# Patient Record
Sex: MALE | Race: WHITE | NOT HISPANIC OR LATINO | URBAN - METROPOLITAN AREA
[De-identification: names, ages, dates, MRNs, and addresses within clinical notes are randomized per-mention and may not be internally consistent; named-entity substitution may affect disease eponyms.]

---

## 2017-01-02 ENCOUNTER — EMERGENCY (EMERGENCY)
Facility: HOSPITAL | Age: 22
LOS: 1 days | Discharge: HOME | End: 2017-01-02
Admitting: INTERNAL MEDICINE
Payer: MEDICAID

## 2017-01-02 PROCEDURE — G0463: CPT

## 2017-07-24 ENCOUNTER — EMERGENCY (EMERGENCY)
Facility: HOSPITAL | Age: 22
LOS: 1 days | Discharge: HOME | End: 2017-07-24
Admitting: EMERGENCY MEDICINE
Payer: COMMERCIAL

## 2017-07-24 ENCOUNTER — TRANSCRIPTION ENCOUNTER (OUTPATIENT)
Age: 22
End: 2017-07-24

## 2017-07-24 PROCEDURE — 99284 EMERGENCY DEPT VISIT MOD MDM: CPT

## 2017-07-25 ENCOUNTER — OUTPATIENT (OUTPATIENT)
Dept: OUTPATIENT SERVICES | Facility: HOSPITAL | Age: 22
LOS: 1 days | End: 2017-07-25
Payer: COMMERCIAL

## 2017-07-25 ENCOUNTER — APPOINTMENT (OUTPATIENT)
Dept: RADIOLOGY | Facility: HOSPITAL | Age: 22
End: 2017-07-25
Payer: MEDICAID

## 2017-07-25 PROCEDURE — 99283 EMERGENCY DEPT VISIT LOW MDM: CPT | Mod: 25

## 2017-07-25 PROCEDURE — 96372 THER/PROPH/DIAG INJ SC/IM: CPT

## 2017-07-25 PROCEDURE — 72100 X-RAY EXAM L-S SPINE 2/3 VWS: CPT | Mod: 26

## 2017-07-25 PROCEDURE — 72100 X-RAY EXAM L-S SPINE 2/3 VWS: CPT

## 2019-06-16 ENCOUNTER — EMERGENCY (EMERGENCY)
Facility: HOSPITAL | Age: 24
LOS: 1 days | Discharge: HOME | End: 2019-06-16
Attending: INTERNAL MEDICINE | Admitting: INTERNAL MEDICINE
Payer: MEDICAID

## 2019-06-16 VITALS
RESPIRATION RATE: 17 BRPM | SYSTOLIC BLOOD PRESSURE: 138 MMHG | OXYGEN SATURATION: 100 % | HEART RATE: 91 BPM | DIASTOLIC BLOOD PRESSURE: 75 MMHG

## 2019-06-16 VITALS
TEMPERATURE: 98 F | DIASTOLIC BLOOD PRESSURE: 79 MMHG | WEIGHT: 184.97 LBS | HEIGHT: 69 IN | RESPIRATION RATE: 18 BRPM | SYSTOLIC BLOOD PRESSURE: 146 MMHG | HEART RATE: 98 BPM

## 2019-06-16 PROCEDURE — 96372 THER/PROPH/DIAG INJ SC/IM: CPT

## 2019-06-16 PROCEDURE — 99283 EMERGENCY DEPT VISIT LOW MDM: CPT | Mod: 25

## 2019-06-16 PROCEDURE — 99283 EMERGENCY DEPT VISIT LOW MDM: CPT

## 2019-06-16 RX ORDER — KETOROLAC TROMETHAMINE 30 MG/ML
30 SYRINGE (ML) INJECTION ONCE
Refills: 0 | Status: DISCONTINUED | OUTPATIENT
Start: 2019-06-16 | End: 2019-06-16

## 2019-06-16 RX ORDER — LIDOCAINE 4 G/100G
2 CREAM TOPICAL ONCE
Refills: 0 | Status: COMPLETED | OUTPATIENT
Start: 2019-06-16 | End: 2019-06-16

## 2019-06-16 RX ORDER — IBUPROFEN 200 MG
1 TABLET ORAL
Qty: 20 | Refills: 0
Start: 2019-06-16 | End: 2019-06-20

## 2019-06-16 RX ORDER — DIAZEPAM 5 MG
1 TABLET ORAL
Qty: 9 | Refills: 0
Start: 2019-06-16 | End: 2019-06-18

## 2019-06-16 RX ORDER — LIDOCAINE 4 G/100G
1 CREAM TOPICAL
Qty: 10 | Refills: 0
Start: 2019-06-16 | End: 2019-06-20

## 2019-06-16 RX ADMIN — LIDOCAINE 2 PATCH: 4 CREAM TOPICAL at 15:53

## 2019-06-16 RX ADMIN — Medication 30 MILLIGRAM(S): at 15:53

## 2019-06-16 RX ADMIN — Medication 30 MILLIGRAM(S): at 16:00

## 2019-06-16 NOTE — ED ADULT NURSE NOTE - NSIMPLEMENTINTERV_GEN_ALL_ED
Implemented All Universal Safety Interventions:  [place] to call system. Call bell, personal items and telephone within reach. Instruct patient to call for assistance. Room bathroom lighting operational. Non-slip footwear when patient is off stretcher. Physically safe environment: no spills, clutter or unnecessary equipment. Stretcher in lowest position, wheels locked, appropriate side rails in place.

## 2019-06-16 NOTE — ED ADULT TRIAGE NOTE - CHIEF COMPLAINT QUOTE
Pt reports that he hurt his back on Tuesday lifting heavy object.  Pain has increased since that time, unrelieved by OTC medications.

## 2019-06-16 NOTE — ED ADULT NURSE NOTE - OBJECTIVE STATEMENT
Pt came in complaining of lower back pain he rates 10/10. Pt states that he hurt his back on Tuesday at work lifting heavy object.  pt states pain has increased since tuesday, and is unrelieved by OTC medications. Pt states he has PMH of back pain which he was seeing a chiropractor for about 3 years ago. Pt denies any n/v/d, chest pain, SOB, blurred vision, headache, dizziness, fever, chills or any other complaint at this time.

## 2019-06-16 NOTE — ED PROVIDER NOTE - CARE PROVIDER_API CALL
Mukesh Jang)  Orthopaedic Sports Medicine; Orthopaedic Surgery  [address] [address]  Phone: [phone]  Fax: [phone]  Follow Up Time:

## 2019-06-16 NOTE — ED PROVIDER NOTE - NSFOLLOWUPINSTRUCTIONS_ED_ALL_ED_FT
Follow with your PMD within 48-72 hours.  Ortho referral above if your symptoms do no improve.   Rest, no heavy lifting.    Warm compresses to area.    Light walking. KEEP MOVING!!!  Take Motrin 600 mg every 6-8 hours for pain with food.  Apply 1-2 Lidoderm patches to your back once a day- leave on for only 12 hours and remove for 12 hours.   Take Valium 5mg at bedtime as needed for muscle spasm- caution drowsiness/do not drive nor operate heavy machinery when taking.   If your symptoms continue, worsen or and new concerning symptoms develop, return to the emergency department.

## 2019-06-16 NOTE — ED PROVIDER NOTE - PROGRESS NOTE DETAILS
DAXA Serrano- patient feeling better s/p Toradol and Lidoderm. Driving home. Valium to the pharm with Motrin and Lidoderm patches. Ortho follow up

## 2019-06-16 NOTE — ED PROVIDER NOTE - CLINICAL SUMMARY MEDICAL DECISION MAKING FREE TEXT BOX
23 y/o M with lumbar para-spinal muscle spasming s/p lumbar strain driving home- Toradol IM, Lidoderm patch, Valium for home at night 25 y/o M with lumbar para-spinal muscle spasming s/p lumbar strain, driving home- Toradol IM, Lidoderm patch, Valium for home at night

## 2019-06-16 NOTE — ED PROVIDER NOTE - ATTENDING CONTRIBUTION TO CARE
· HPI Objective Statement: 23 y/o M h/o depression and anxiety pw lower back pain x 5 days. States he was lifting 50 lb boxes at work and felt "soreness" when he went home that night. Over the next few days the back pain progressively worsened. Has taken Advil without relief. States pain with movement and at rest. Denies radiculopathy of pain, weakness, numbness, tingling, bowel/urinary incontinence. Wearing a waist belt for support. No prior back injuries.  Drove here.  pe + paraspinal lumbar spasm , tenderness , neuro vs bilateral LL intact  pt rx with nsaids, ms relaxants   Dr. Napier:  I have reviewed and discussed with the PA/ resident the case specifics, including the history, physical assessment, evaluation, conclusion, laboratory results, and medical plan. I agree with the contents, and conclusions. I have personally examined, and interviewed the patient.

## 2019-06-16 NOTE — ED PROVIDER NOTE - CHPI ED SYMPTOMS NEG
no numbness/no difficulty bearing weight/no motor function loss/no neck tenderness/no tingling/no bowel dysfunction/no fatigue/no bladder dysfunction

## 2019-06-18 ENCOUNTER — APPOINTMENT (OUTPATIENT)
Dept: ORTHOPEDIC SURGERY | Facility: CLINIC | Age: 24
End: 2019-06-18
Payer: OTHER MISCELLANEOUS

## 2019-06-18 VITALS
BODY MASS INDEX: 27.4 KG/M2 | HEART RATE: 90 BPM | WEIGHT: 185 LBS | SYSTOLIC BLOOD PRESSURE: 108 MMHG | DIASTOLIC BLOOD PRESSURE: 71 MMHG | HEIGHT: 69 IN

## 2019-06-18 DIAGNOSIS — Z72.0 TOBACCO USE: ICD-10-CM

## 2019-06-18 DIAGNOSIS — M54.5 LOW BACK PAIN: ICD-10-CM

## 2019-06-18 DIAGNOSIS — F41.1 GENERALIZED ANXIETY DISORDER: ICD-10-CM

## 2019-06-18 DIAGNOSIS — G89.29 LOW BACK PAIN: ICD-10-CM

## 2019-06-18 PROCEDURE — 72100 X-RAY EXAM L-S SPINE 2/3 VWS: CPT

## 2019-06-18 PROCEDURE — 99204 OFFICE O/P NEW MOD 45 MIN: CPT

## 2019-06-18 RX ORDER — CYCLOBENZAPRINE HYDROCHLORIDE 10 MG/1
10 TABLET, FILM COATED ORAL 3 TIMES DAILY
Qty: 30 | Refills: 3 | Status: ACTIVE | COMMUNITY
Start: 2019-06-18 | End: 1900-01-01

## 2019-06-18 RX ORDER — DICLOFENAC SODIUM 75 MG/1
75 TABLET, DELAYED RELEASE ORAL
Qty: 60 | Refills: 0 | Status: ACTIVE | COMMUNITY
Start: 2019-06-18 | End: 1900-01-01

## 2019-06-18 NOTE — PHYSICAL EXAM
[de-identified] : He is fully alert and oriented with a normal mood and affect. He is in obvious distress attempting to transfer off the examining table. He can tiptoe and heel walk. On stance evaluation there is a list to the left that increases with forward flexion. There is no cutaneous abnormalities no palpable bony defects of the spine. There is no evidence of shortness of breath or respiratory distress. There is some right sided paravertebral muscle spasm but no normal. There is a trace of the sciatic notch sensitivity bilaterally. There is no trochanteric tenderness. Forward flexion of the spine is markedly limited to only 20° bilateral lower back pain. His lower extremity neurological examination revealed 1-2+ symmetrical reflexes with normal motor power and sensation. Straight leg raising is limited to 70 or 80° by lower back pain. His knees have a full range of motion with normal stability. Vascular exam shows no evidence of varicosities there is no lymphedema. There are no cutaneous abnormalities of the upper lower extremities. His upper extremities are normal to inspection and his elbows have a full range of motion with normal motor power and stability. [de-identified] : AP and lateral x-rays of the lumbar spine revealed the list which is evident on exam. Sagittal alignment is normal. There is a suggestion of some mild disc space narrowing at the L4-5 level. There are no destructive changes

## 2019-06-18 NOTE — DISCUSSION/SUMMARY
[Medication Risks Reviewed] : Medication risks reviewed [de-identified] : I recommended rest and moist heat. He has been started on diclofenac 75 mg twice a day as a nonsteroidal anti-inflammatory. I will also prescribe Flexeril 10 mg 3 times a day as tolerated. I will see him for followup in 3 weeks. When his symptoms have resolved he will be sent to physical therapy for instruction in lower back exercises.

## 2019-06-18 NOTE — HISTORY OF PRESENT ILLNESS
[de-identified] : This 24-year-old male has a 3-4 year history of intermittent lower back pain. He has not had an associated leg pain. He denies neurologic symptoms of numbness, paresthesias or weakness. The symptoms became worse one week ago after lifting. The pain is worse with coughing and sneezing. He gets occasional night pain. The pain is worse with sitting and driving more so than standing and walking. He was seen in the emergency room where he was given a Toradol shot and a prescription for Valium. He has a history of depression and anxiety. [Pain Location] : pain [10] : a maximum pain level of 10/10 [Walking] : walking [Sitting] : sitting [Standing] : standing [Lifting] : lifting

## 2019-07-01 PROBLEM — Z78.9 OTHER SPECIFIED HEALTH STATUS: Chronic | Status: ACTIVE | Noted: 2019-06-16

## 2019-07-03 ENCOUNTER — APPOINTMENT (OUTPATIENT)
Dept: ORTHOPEDIC SURGERY | Facility: CLINIC | Age: 24
End: 2019-07-03
Payer: OTHER MISCELLANEOUS

## 2019-07-03 DIAGNOSIS — M51.36 OTHER INTERVERTEBRAL DISC DEGENERATION, LUMBAR REGION: ICD-10-CM

## 2019-07-03 DIAGNOSIS — G89.29 LOW BACK PAIN: ICD-10-CM

## 2019-07-03 DIAGNOSIS — M54.5 LOW BACK PAIN: ICD-10-CM

## 2019-07-03 PROCEDURE — 99214 OFFICE O/P EST MOD 30 MIN: CPT

## 2019-07-03 NOTE — REASON FOR VISIT
[Follow-Up Visit] : a follow-up visit for [Back Pain] : back pain [Worker's Compensation] : this visit is related to worker's compensation

## 2019-07-03 NOTE — PHYSICAL EXAM
[de-identified] : He is quite comfortable today. There is no muscle spasm straight leg raising is negative to 90° [No Restrictions] : No work restrictions

## 2019-07-03 NOTE — HISTORY OF PRESENT ILLNESS
[Improving] : improving [de-identified] : He did not have problems tolerating that well and went of Flexeril and his symptoms resolved in several days. He stopped the medication. He currently has no pain. [Pain Location] : pain [0] : a maximum pain level of 0/10

## 2019-07-03 NOTE — DISCUSSION/SUMMARY
[Medication Risks Reviewed] : Medication risks reviewed [de-identified] : He can return to work at this point. He was given a prescription to see a physical therapist for instruction in lower back exercises. He was also given instructions on how to handle future episodes with over-the-counter nonsteroidal anti-inflammatories. I will see him for followup on a p.r.n. basis.

## 2019-07-18 ENCOUNTER — RX RENEWAL (OUTPATIENT)
Age: 24
End: 2019-07-18

## 2019-08-01 ENCOUNTER — RX RENEWAL (OUTPATIENT)
Age: 24
End: 2019-08-01

## 2020-03-19 NOTE — ED ADULT NURSE NOTE - PERIPHERAL VASCULAR WDL
GROUP THERAPY PROGRESS NOTE    Patient did not participate in Coping Skills group.      Jorja Cheadle LPC LSATP CSAC Pulses equal bilaterally, no edema present.

## 2020-06-11 NOTE — ED ADULT NURSE NOTE - NSFALLRSKASSESSDT_ED_ALL_ED
Well-Child Checkup: 6 to 15 Years  Between ages 6 and 15, your child will grow and change a lot. It’s important to keep having yearly checkups so the healthcare provider can track this progress.  As your child enters puberty, he or she may become more e Puberty is the stage when a child begins to develop sexually into an adult. It usually starts between 9 and 14 for girls, and between 12 and 16 for boys. Here is some of what you can expect when puberty begins:  · Acne and body odor.  Hormones that increase Today, kids are less active and eat more junk food than ever before. Your child is starting to make choices about what to eat and how active to be. You can’t always have the final say, but you can help your child develop healthy habits.  Here are some tips: · Serve and encourage healthy foods. Your child is making more food decisions on his or her own. All foods have a place in a balanced diet. Fruits, vegetables, lean meats, and whole grains should be eaten every day.  Save less healthy foods—like [employer] · If your child has a cell phone or portable music player, make sure these are used safely and responsibly. Do not allow your child to talk on the phone, text, or listen to music with headphones while he or she is riding a bike or walking outdoors.  Remind · Set limits for the use of cell phones, the computer, and the Internet. Remind your child that you can check the web browser history and cell phone logs to know how these devices are being used.  Use parental controls and passwords to block access to [employer] 16-Jun-2019 15:23 o 5 servings of fruits and vegetables a day  o 4 servings of water a day  o 3 servings of low-fat dairy a day  o 2 or less hours of screen time a day  o 1 or more hours of physical activity a day    To help children live healthy active lives, parents can:

## 2020-09-30 ENCOUNTER — TRANSCRIPTION ENCOUNTER (OUTPATIENT)
Age: 25
End: 2020-09-30

## 2021-04-29 ENCOUNTER — TRANSCRIPTION ENCOUNTER (OUTPATIENT)
Age: 26
End: 2021-04-29

## 2021-05-01 ENCOUNTER — TRANSCRIPTION ENCOUNTER (OUTPATIENT)
Age: 26
End: 2021-05-01

## 2021-06-25 NOTE — ED PROVIDER NOTE - OBJECTIVE STATEMENT
Jai COURTNEY Mateo arrives via [facility] from home with c/o RLQ pain, pt was seen yesterday and dx with a hernia. Pt states pain was worsening overnight but denies any current pain. 1 episode of vomiting. Denies any current nausea   23 y/o M h/o depression and anxiety pw lower back pain x 5 days. States he was lifting 50 lb boxes at work and felt "soreness" when he went home that night. Over the next few days the back pain progressively worsened. Has taken Advil without relief. States pain with movement and at rest. Denies radiculopathy of pain, weakness, numbness, tingling, bowel/urinary incontinence. Wearing a waist belt for support. No prior back injuries.  Drove here.

## 2023-08-02 NOTE — ED PROVIDER NOTE - PROVIDER TOKENS
HR=67 bpm, MOFG=243/95 mmhg, LtH3=246 %, Resp=25 B/min, EtCO2=21 mmHg, Pain=8, Don=2, Dave=2 PROVIDER:[TOKEN:[[id number]]]